# Patient Record
Sex: FEMALE | Race: WHITE | NOT HISPANIC OR LATINO | Employment: OTHER | URBAN - METROPOLITAN AREA
[De-identification: names, ages, dates, MRNs, and addresses within clinical notes are randomized per-mention and may not be internally consistent; named-entity substitution may affect disease eponyms.]

---

## 2017-07-25 ENCOUNTER — TELEPHONE (OUTPATIENT)
Dept: NURSING | Facility: CLINIC | Age: 72
End: 2017-07-25

## 2017-07-25 NOTE — TELEPHONE ENCOUNTER
"Pt asking if she needs to be seen for an annual it has been two yrs and she doesn't have a uterus. Informed scheduling \"Yes\" they will still want to do a breast exam, pelvic and vaginal PAP smear. Scheduling verbalized understanding.  "

## 2017-07-25 NOTE — TELEPHONE ENCOUNTER
Pt calling to schedule mammogram. Pt upset with choosing either a 2D or 3D mammogram. Pt also upset that she received letter with the wrong number to schedule her appointment. Pt informed to contact her insurance to find out what they cover. Pt thinks she had 2D in past and would like to schedule this. Transferred back to scheduling to make appointment. Scheduling letter given to clinic supervisor.

## 2017-08-02 ENCOUNTER — RADIANT APPOINTMENT (OUTPATIENT)
Dept: MAMMOGRAPHY | Facility: CLINIC | Age: 72
End: 2017-08-02
Payer: COMMERCIAL

## 2017-08-02 ENCOUNTER — OFFICE VISIT (OUTPATIENT)
Dept: OBGYN | Facility: CLINIC | Age: 72
End: 2017-08-02
Payer: COMMERCIAL

## 2017-08-02 VITALS — SYSTOLIC BLOOD PRESSURE: 120 MMHG | BODY MASS INDEX: 27.13 KG/M2 | WEIGHT: 170 LBS | DIASTOLIC BLOOD PRESSURE: 80 MMHG

## 2017-08-02 DIAGNOSIS — Z01.419 ENCOUNTER FOR GYNECOLOGICAL EXAMINATION WITHOUT ABNORMAL FINDING: Primary | ICD-10-CM

## 2017-08-02 DIAGNOSIS — Z12.31 VISIT FOR SCREENING MAMMOGRAM: ICD-10-CM

## 2017-08-02 PROCEDURE — G0202 SCR MAMMO BI INCL CAD: HCPCS | Mod: TC

## 2017-08-02 PROCEDURE — 99397 PER PM REEVAL EST PAT 65+ YR: CPT | Performed by: OBSTETRICS & GYNECOLOGY

## 2017-08-02 ASSESSMENT — PATIENT HEALTH QUESTIONNAIRE - PHQ9: 5. POOR APPETITE OR OVEREATING: NOT AT ALL

## 2017-08-02 ASSESSMENT — ANXIETY QUESTIONNAIRES
7. FEELING AFRAID AS IF SOMETHING AWFUL MIGHT HAPPEN: NOT AT ALL
5. BEING SO RESTLESS THAT IT IS HARD TO SIT STILL: NOT AT ALL
2. NOT BEING ABLE TO STOP OR CONTROL WORRYING: NOT AT ALL
3. WORRYING TOO MUCH ABOUT DIFFERENT THINGS: NOT AT ALL
1. FEELING NERVOUS, ANXIOUS, OR ON EDGE: NOT AT ALL
GAD7 TOTAL SCORE: 0
6. BECOMING EASILY ANNOYED OR IRRITABLE: NOT AT ALL
IF YOU CHECKED OFF ANY PROBLEMS ON THIS QUESTIONNAIRE, HOW DIFFICULT HAVE THESE PROBLEMS MADE IT FOR YOU TO DO YOUR WORK, TAKE CARE OF THINGS AT HOME, OR GET ALONG WITH OTHER PEOPLE: NOT DIFFICULT AT ALL

## 2017-08-02 NOTE — MR AVS SNAPSHOT
"              After Visit Summary   2017    Yamileth Duke    MRN: [id number]           Patient Information     Date Of Birth          1945        Visit Information        Provider Department      2017 3:00 PM Stefano Hunt MD [facility]        Today's Diagnoses     Encounter for gynecological examination without abnormal finding    -  1       Follow-ups after your visit        Who to contact     If you have questions or need follow up information about today's clinic visit or your schedule please contact [facility] directly at [phone].  Normal or non-critical lab and imaging results will be communicated to you by Trellohart, letter or phone within 4 business days after the clinic has received the results. If you do not hear from us within 7 days, please contact the clinic through Trellohart or phone. If you have a critical or abnormal lab result, we will notify you by phone as soon as possible.  Submit refill requests through [employer] or call your pharmacy and they will forward the refill request to us. Please allow 3 business days for your refill to be completed.          Additional Information About Your Visit        MyChart Information     [employer] lets you send messages to your doctor, view your test results, renew your prescriptions, schedule appointments and more. To sign up, go to [email or web address] . Click on \"Log in\" on the left side of the screen, which will take you to the Welcome page. Then click on \"Sign up Now\" on the right side of the page.     You will be asked to enter the access code listed below, as well as some personal information. Please follow the directions to create your username and password.     Your access code is: [email or web address]  Expires: 10/31/2017  3:33 PM     Your access code will  in 90 days. If you need help or a new code, please call your [facility] clinic or [phone].        Care EveryWhere ID     This is your " Care EveryWhere ID. This could be used by other organizations to access your [facility] medical records  [id number]        Your Vitals Were     BMI (Body Mass Index)                   27.13 kg/m2            Blood Pressure from Last 3 Encounters:   08/02/17 120/80   07/17/15 118/90   05/01/15 128/76    Weight from Last 3 Encounters:   08/02/17 170 lb (77.1 kg)   07/17/15 168 lb (76.2 kg)   05/01/15 160 lb (72.6 kg)              Today, you had the following     No orders found for display         Today's Medication Changes          These changes are accurate as of: 8/2/17  3:33 PM.  If you have any questions, ask your nurse or doctor.               Stop taking these medicines if you haven't already. Please contact your care team if you have questions.     predniSONE 1 MG tablet   Commonly known as:  DELTASONE   Stopped by:  Stefano Hunt MD                    Primary Care Provider Office Phone # Fax #    Rosaura Disla [phone]       [address] [address]        Equal Access to Services     [facility] AH: Hadii chris murillo hadasho Soness, waaxda luqadaha, qaybta kaalmabrenda hawkins, rao canales . So [facility] [phone].    ATENCIÓN: Si habla español, tiene a zhang disposición servicios gratuitos de asistencia lingüística. [facility] [phone].    We comply with applicable federal civil rights laws and [place] laws. We do not discriminate on the basis of race, color, national origin, age, disability sex, sexual orientation or gender identity.            Thank you!     Thank you for choosing [facility]  for your care. Our goal is always to provide you with excellent care. Hearing back from our patients is one way we can continue to improve our services. Please take a few minutes to complete the written survey that you may receive in the mail after your visit with us. Thank you!             Your Updated  Medication List - Protect others around you: Learn how to safely use, store and throw away your medicines at www.disposemymeds.org.          This list is accurate as of: 8/2/17  3:33 PM.  Always use your most recent med list.                   Brand Name Dispense Instructions for use Diagnosis    LEXAPRO PO      Take 10 mg by mouth daily

## 2017-08-02 NOTE — PROGRESS NOTES
Yamileth is a 71 year old No obstetric history on file. female who presents for annual exam.     Besides routine health maintenance, she has no other health concerns today .    HPI:  The patient's PCP is  Rosaura Disla MD.      Patient seen for her yearly exam.  Since her last appointment patient had an episode of polymyalgia for which she took prednisone for 1 year.  She is also  from her .  She is generally feeling well at this point.  She has no complaints today.      GYNECOLOGIC HISTORY:    No LMP recorded. Patient is postmenopausal.  Her current contraception method is: menopause.  She  reports that she has never smoked. She has never used smokeless tobacco.      Patient is not sexually active.  STD testing offered?  Declined  Last PHQ-9 score on record = PHQ-9 SCORE 8/2/2017   Total Score 0     Last GAD7 score on record =   LASHON-7 SCORE 8/2/2017   Total Score 0     Alcohol Score = 3    HEALTH MAINTENANCE:  Cholesterol: (No results found for: CHOL   Last Mammo: 7/19/16, Result: normal, Next Mammo: today 2D  Pap: 7/15/14, Neg, Hpv Neg, no longer needed Hysterectomy  Colonoscopy:  5/1/15, Result: polyps, Next Colonoscopy: 2018  Dexa:  7/17/15 Spine -1.7, Lfn -1.5, Rfn -0.9    Health maintenance updated:  yes    HISTORY:  Obstetric History     No data available          Patient Active Problem List   Diagnosis     Vaginal prolapse     Past Surgical History:   Procedure Laterality Date     COLONOSCOPY       COLONOSCOPY N/A 5/1/2015    Procedure: COMBINED COLONOSCOPY, SINGLE OR MULTIPLE BIOPSY/POLYPECTOMY BY BIOPSY;  Surgeon: Joe Mosley MD;  Location:  GI     COLPORRHAPHY ANTERIOR, POSTERIOR, COMBINED  1/31/2014    Procedure: COMBINED COLPORRHAPHY ANTERIOR, POSTERIOR;  ANTERIOR AND POSTERIOR REPAIR, ENTEROCOELE, PERINEOPLASTY;  Surgeon: Stefano Hunt MD;  Location:  OR     CYSTOCELE REPAIR  2014     EYE SURGERY      retinal detachment repair     EYE SURGERY      cataract removal      GYN SURGERY  1998    D&C, right ovary removed     GYN SURGERY  2000    hysterectomy, bladder repair     RECTOCELE REPAIR  2014      Social History   Substance Use Topics     Smoking status: Never Smoker     Smokeless tobacco: Never Used     Alcohol use Yes      Comment: occasionally           Current Outpatient Prescriptions   Medication Sig     Escitalopram Oxalate (LEXAPRO PO) Take 10 mg by mouth daily     No current facility-administered medications for this visit.      Facility-Administered Medications Ordered in Other Visits   Medication     bupivacaine 0.75% in dextrose 8.25% (intrathecal) (SENSORCAINE) 0.75-8.25 % injection     Allergies   Allergen Reactions     Penicillins Rash       Past medical, surgical, social and family histories were reviewed and updated in EPIC.    ROS:   12 point review of systems negative other than symptoms noted below.    EXAM:  /80  Wt 170 lb (77.1 kg)  BMI 27.13 kg/m2   BMI: Body mass index is 27.13 kg/(m^2).    PHYSICAL EXAM:  Constitutional:  Appearance: Well nourished, well developed, alert, in no acute distress  Neck:  Lymph Nodes:  No lymphadenopathy present    Thyroid:  Gland size normal, nontender, no nodules or masses present  on palpation  Chest:  Respiratory Effort:  Breathing unlabored  Cardiovascular:    Heart: Auscultation:  Regular rate, normal rhythm, no murmurs present  Breasts: Inspection of Breasts:  No lymphadenopathy present    Palpation of Breasts and Axillae:  No masses present on palpation, no  breast tenderness    Axillary Lymph Nodes:  No lymphadenopathy present  Gastrointestinal:   Abdominal Examination:  Abdomen nontender to palpation, tone normal without rigidity or guarding, no masses present, umbilicus without lesions   Liver and Spleen:  No hepatomegaly present, liver nontender to palpation    Hernias:  No hernias present  Lymphatic: Lymph Nodes:  No other lymphadenopathy present  Skin:  General Inspection:  No rashes present, no  lesions present, no areas of  discoloration    Genitalia and Groin:  No rashes present, no lesions present, no areas of  discoloration, no masses present  Neurologic/Psychiatric:    Mental Status:  Oriented X3     Pelvic Exam:  External Genitalia:     Normal appearance for age, no discharge present, no tenderness present, no inflammatory lesions present, color normal  Vagina:     Normal vaginal vault without central or paravaginal defects, no discharge present, no inflammatory lesions present, no masses present  Bladder:     Nontender to palpation  Urethra:   Urethral Body:  Urethra palpation normal, urethra structural support normal   Urethral Meatus:  No erythema or lesions present  Cervix:     Surgically absent  Uterus:     Surgically absent  Adnexa:     Surgically absent  Perineum:     Perineum within normal limits, no evidence of trauma, no rashes or skin lesions present  Anus:     Anus within normal limits, no hemorrhoids present  Inguinal Lymph Nodes:     No lymphadenopathy present      COUNSELING:   Reviewed preventive health counseling, as reflected in patient instructions       Regular exercise       Healthy diet/nutrition    BMI: Body mass index is 27.13 kg/(m^2).        ASSESSMENT:  71 year old female with satisfactory annual exam.No diagnosis found.    PLAN:  Return to clinic as needed or in 1 year.    Stefano Hunt MD

## 2017-08-03 ASSESSMENT — PATIENT HEALTH QUESTIONNAIRE - PHQ9: SUM OF ALL RESPONSES TO PHQ QUESTIONS 1-9: 0

## 2017-08-03 ASSESSMENT — ANXIETY QUESTIONNAIRES: GAD7 TOTAL SCORE: 0

## 2018-08-22 ENCOUNTER — RADIANT APPOINTMENT (OUTPATIENT)
Dept: MAMMOGRAPHY | Facility: CLINIC | Age: 73
End: 2018-08-22
Attending: OBSTETRICS & GYNECOLOGY
Payer: COMMERCIAL

## 2018-08-22 DIAGNOSIS — Z12.31 VISIT FOR SCREENING MAMMOGRAM: ICD-10-CM

## 2018-08-22 PROCEDURE — 77067 SCR MAMMO BI INCL CAD: CPT | Mod: TC

## 2018-08-22 PROCEDURE — 77063 BREAST TOMOSYNTHESIS BI: CPT | Mod: TC

## 2019-01-11 LAB
CREAT SERPL-MCNC: 0.69 MG/DL (ref 0.51–0.95)
GFR SERPL CREATININE-BSD FRML MDRD: >60 ML/MIN/1.73ME2 (ref 60–150)
GLUCOSE SERPL-MCNC: 108 MG/DL (ref 74–100)
POTASSIUM SERPL-SCNC: 4.1 MMOL/L (ref 3.5–5.1)

## 2019-01-15 ENCOUNTER — TRANSFERRED RECORDS (OUTPATIENT)
Dept: HEALTH INFORMATION MANAGEMENT | Facility: CLINIC | Age: 74
End: 2019-01-15

## 2019-01-15 ENCOUNTER — OFFICE VISIT (OUTPATIENT)
Dept: OBGYN | Facility: CLINIC | Age: 74
End: 2019-01-15
Payer: COMMERCIAL

## 2019-01-15 VITALS
DIASTOLIC BLOOD PRESSURE: 74 MMHG | HEIGHT: 67 IN | BODY MASS INDEX: 26.68 KG/M2 | WEIGHT: 170 LBS | HEART RATE: 74 BPM | SYSTOLIC BLOOD PRESSURE: 128 MMHG

## 2019-01-15 DIAGNOSIS — N30.00 ACUTE CYSTITIS WITHOUT HEMATURIA: Primary | ICD-10-CM

## 2019-01-15 PROCEDURE — 99213 OFFICE O/P EST LOW 20 MIN: CPT | Performed by: OBSTETRICS & GYNECOLOGY

## 2019-01-15 RX ORDER — TIMOLOL MALEATE 5 MG/ML
1 SOLUTION/ DROPS OPHTHALMIC DAILY
COMMUNITY
Start: 2018-04-24

## 2019-01-15 RX ORDER — CIPROFLOXACIN 500 MG/1
500 TABLET, FILM COATED ORAL 2 TIMES DAILY
COMMUNITY
Start: 2019-01-15 | End: 2019-01-22

## 2019-01-15 RX ORDER — MIRTAZAPINE 15 MG/1
7.5 TABLET, FILM COATED ORAL
COMMUNITY
Start: 2018-11-08 | End: 2024-09-10

## 2019-01-15 RX ORDER — LORAZEPAM 0.5 MG/1
1 TABLET ORAL AT BEDTIME
COMMUNITY
Start: 2018-03-13

## 2019-01-15 RX ORDER — PHENAZOPYRIDINE HYDROCHLORIDE 100 MG/1
TABLET, FILM COATED ORAL
Refills: 0 | COMMUNITY
Start: 2019-01-11 | End: 2024-09-10

## 2019-01-15 ASSESSMENT — MIFFLIN-ST. JEOR: SCORE: 1303.74

## 2019-01-15 NOTE — PROGRESS NOTES
SUBJECTIVE:                                                   Yamileth Duke is a 73 year old female who presents to clinic today for the following health issue(s):  Patient presents with:  ER F/U: was seen at [facility] for bladder issues- dysuria, frequency and pressure twice within a week. Last Friday all came back negative, this morning came back w/ bladder infection      HPI:  Patient is seen today for urinary tract infection.  Patient went to urgent care earlier yesterday and was diagnosed with a significant UTI.  She was started on Cipro 500 mg twice daily.  She is seen today and she feels that her infection is improved.  This is the only urinary tract infection she has had in several years.  She has a past history of having had a vaginal hysterectomy as well as vaginal repairs for prolapse.  Prior to the infection she was not experiencing any difficulty.    No LMP recorded. Patient is postmenopausal..   Patient is not sexually active, No obstetric history on file..  Using not sexually active for contraception.    reports that  has never smoked. she has never used smokeless tobacco.    STD testing offered?  Declined    Health maintenance updated:  yes    Problem list and histories reviewed & adjusted, as indicated.  Additional history: as documented.    Patient Active Problem List   Diagnosis     Vaginal prolapse     Past Surgical History:   Procedure Laterality Date     COLONOSCOPY       COLONOSCOPY N/A 5/1/2015    Procedure: COMBINED COLONOSCOPY, SINGLE OR MULTIPLE BIOPSY/POLYPECTOMY BY BIOPSY;  Surgeon: Joe Mosley MD;  Location:  GI     COLPORRHAPHY ANTERIOR, POSTERIOR, COMBINED  1/31/2014    Procedure: COMBINED COLPORRHAPHY ANTERIOR, POSTERIOR;  ANTERIOR AND POSTERIOR REPAIR, ENTEROCOELE, PERINEOPLASTY;  Surgeon: Stefano Hunt MD;  Location:  OR     CYSTOCELE REPAIR  2014     EYE SURGERY      retinal detachment repair     EYE SURGERY      cataract removal     GYN  "SURGERY  1998    D&C, right ovary removed     GYN SURGERY  2000    hysterectomy, bladder repair     RECTOCELE REPAIR  2014      Social History     Tobacco Use     Smoking status: Never Smoker     Smokeless tobacco: Never Used   Substance Use Topics     Alcohol use: Yes     Comment: occasionally           Current Outpatient Medications   Medication Sig     ciprofloxacin (CIPRO) 500 MG tablet Take 500 mg by mouth 2 times daily     LORazepam (ATIVAN) 0.5 MG tablet TK 1 T PO BID PRA     mirtazapine (REMERON) 15 MG tablet Take 7.5 mg by mouth     phenazopyridine (PYRIDIUM) 100 MG tablet TK 1 T PO TID AFTER MEALS AS NEEDED     timolol maleate (TIMOPTIC) 0.5 % ophthalmic solution      No current facility-administered medications for this visit.      Facility-Administered Medications Ordered in Other Visits   Medication     bupivacaine 0.75% in dextrose 8.25% (intrathecal) (SENSORCAINE) 0.75-8.25 % injection     Allergies   Allergen Reactions     No Clinical Screening - See Comments      PN: LW Other1: -nka  PN: LW CM1: CONTRAST- nka Reaction :     Penicillins Rash       ROS:  12 point review of systems negative other than symptoms noted below.  Genitourinary: Frequency, Painful Urination and Urgency    OBJECTIVE:     /74   Pulse 74   Ht 1.702 m (5' 7\")   Wt 77.1 kg (170 lb)   BMI 26.63 kg/m    Body mass index is 26.63 kg/m .    Exam:  Constitutional:  Appearance: Well nourished, well developed alert, in no acute distress  Chest:  Respiratory Effort:  Breathing unlabored  Neurologic/Psychiatric:  Mental Status:  Oriented X3      In-Clinic Test Results:  No results found for this or any previous visit (from the past 24 hour(s)).    ASSESSMENT/PLAN:                                                        ICD-10-CM    1. Acute cystitis without hematuria N30.00            Plan: The patient was encouraged to take the 7 days of her medication and then return to her primary care clinic for follow-up urinalysis to make " sure that her infection has cleared.  She will return to clinic as needed.    This appointment was 20 minutes of which more than 50% was face-to-face in consultation coronation of care.    Stefano Hunt MD  [facility]

## 2021-09-14 ENCOUNTER — LAB REQUISITION (OUTPATIENT)
Dept: LAB | Facility: CLINIC | Age: 76
End: 2021-09-14
Payer: COMMERCIAL

## 2021-09-14 DIAGNOSIS — Z03.818 ENCOUNTER FOR OBSERVATION FOR SUSPECTED EXPOSURE TO OTHER BIOLOGICAL AGENTS RULED OUT: ICD-10-CM

## 2021-09-14 PROCEDURE — U0003 INFECTIOUS AGENT DETECTION BY NUCLEIC ACID (DNA OR RNA); SEVERE ACUTE RESPIRATORY SYNDROME CORONAVIRUS 2 (SARS-COV-2) (CORONAVIRUS DISEASE [COVID-19]), AMPLIFIED PROBE TECHNIQUE, MAKING USE OF HIGH THROUGHPUT TECHNOLOGIES AS DESCRIBED BY CMS-2020-01-R: HCPCS | Mod: ORL | Performed by: FAMILY MEDICINE

## 2021-09-15 LAB — SARS-COV-2 RNA RESP QL NAA+PROBE: NEGATIVE

## 2021-09-20 ENCOUNTER — LAB REQUISITION (OUTPATIENT)
Dept: LAB | Facility: CLINIC | Age: 76
End: 2021-09-20
Payer: COMMERCIAL

## 2021-09-20 DIAGNOSIS — Z03.818 ENCOUNTER FOR OBSERVATION FOR SUSPECTED EXPOSURE TO OTHER BIOLOGICAL AGENTS RULED OUT: ICD-10-CM

## 2021-09-21 PROCEDURE — U0003 INFECTIOUS AGENT DETECTION BY NUCLEIC ACID (DNA OR RNA); SEVERE ACUTE RESPIRATORY SYNDROME CORONAVIRUS 2 (SARS-COV-2) (CORONAVIRUS DISEASE [COVID-19]), AMPLIFIED PROBE TECHNIQUE, MAKING USE OF HIGH THROUGHPUT TECHNOLOGIES AS DESCRIBED BY CMS-2020-01-R: HCPCS | Mod: ORL | Performed by: FAMILY MEDICINE

## 2021-09-24 LAB — SARS-COV-2 RNA RESP QL NAA+PROBE: NOT DETECTED

## 2022-01-03 ENCOUNTER — LAB REQUISITION (OUTPATIENT)
Dept: LAB | Facility: CLINIC | Age: 77
End: 2022-01-03
Payer: COMMERCIAL

## 2022-01-03 DIAGNOSIS — Z03.818 ENCOUNTER FOR OBSERVATION FOR SUSPECTED EXPOSURE TO OTHER BIOLOGICAL AGENTS RULED OUT: ICD-10-CM

## 2022-01-03 PROCEDURE — U0003 INFECTIOUS AGENT DETECTION BY NUCLEIC ACID (DNA OR RNA); SEVERE ACUTE RESPIRATORY SYNDROME CORONAVIRUS 2 (SARS-COV-2) (CORONAVIRUS DISEASE [COVID-19]), AMPLIFIED PROBE TECHNIQUE, MAKING USE OF HIGH THROUGHPUT TECHNOLOGIES AS DESCRIBED BY CMS-2020-01-R: HCPCS | Mod: ORL | Performed by: FAMILY MEDICINE

## 2022-01-04 LAB — SARS-COV-2 RNA RESP QL NAA+PROBE: NEGATIVE

## 2023-10-25 PROCEDURE — 88305 TISSUE EXAM BY PATHOLOGIST: CPT | Performed by: PATHOLOGY

## 2023-10-26 ENCOUNTER — LAB REQUISITION (OUTPATIENT)
Dept: LAB | Facility: CLINIC | Age: 78
End: 2023-10-26

## 2023-10-26 DIAGNOSIS — K30 FUNCTIONAL DYSPEPSIA: ICD-10-CM

## 2023-10-26 DIAGNOSIS — R12 HEARTBURN: ICD-10-CM

## 2023-10-26 DIAGNOSIS — K31.89 OTHER DISEASES OF STOMACH AND DUODENUM: ICD-10-CM

## 2023-10-26 DIAGNOSIS — K31.7 POLYP OF STOMACH AND DUODENUM: ICD-10-CM

## 2023-10-27 LAB
PATH REPORT.COMMENTS IMP SPEC: NORMAL
PATH REPORT.COMMENTS IMP SPEC: NORMAL
PATH REPORT.FINAL DX SPEC: NORMAL
PATH REPORT.GROSS SPEC: NORMAL
PATH REPORT.MICROSCOPIC SPEC OTHER STN: NORMAL
PATH REPORT.RELEVANT HX SPEC: NORMAL
PHOTO IMAGE: NORMAL

## 2024-09-10 ENCOUNTER — HOSPITAL ENCOUNTER (OUTPATIENT)
Facility: CLINIC | Age: 79
Setting detail: OBSERVATION
Discharge: HOME | End: 2024-09-12
Attending: EMERGENCY MEDICINE | Admitting: EMERGENCY MEDICINE
Payer: COMMERCIAL

## 2024-09-10 DIAGNOSIS — F51.01 PRIMARY INSOMNIA: ICD-10-CM

## 2024-09-10 DIAGNOSIS — F33.1 MAJOR DEPRESSIVE DISORDER, RECURRENT EPISODE, MODERATE (H): ICD-10-CM

## 2024-09-10 DIAGNOSIS — F41.1 GAD (GENERALIZED ANXIETY DISORDER): ICD-10-CM

## 2024-09-10 PROBLEM — F41.9 ANXIETY: Chronic | Status: ACTIVE | Noted: 2024-09-10

## 2024-09-10 PROCEDURE — G0378 HOSPITAL OBSERVATION PER HR: HCPCS

## 2024-09-10 PROCEDURE — 250N000013 HC RX MED GY IP 250 OP 250 PS 637: Performed by: PSYCHIATRY & NEUROLOGY

## 2024-09-10 PROCEDURE — 99285 EMERGENCY DEPT VISIT HI MDM: CPT

## 2024-09-10 PROCEDURE — 99222 1ST HOSP IP/OBS MODERATE 55: CPT | Mod: AI | Performed by: PSYCHIATRY & NEUROLOGY

## 2024-09-10 PROCEDURE — 250N000013 HC RX MED GY IP 250 OP 250 PS 637: Performed by: EMERGENCY MEDICINE

## 2024-09-10 RX ORDER — LORAZEPAM 1 MG/1
1 TABLET ORAL AT BEDTIME
Status: DISCONTINUED | OUTPATIENT
Start: 2024-09-10 | End: 2024-09-12 | Stop reason: HOSPADM

## 2024-09-10 RX ORDER — METOPROLOL SUCCINATE 25 MG/1
0.5 TABLET, EXTENDED RELEASE ORAL DAILY
COMMUNITY
Start: 2024-05-02

## 2024-09-10 RX ORDER — LANOLIN ALCOHOL/MO/W.PET/CERES
3 CREAM (GRAM) TOPICAL
Status: DISCONTINUED | OUTPATIENT
Start: 2024-09-10 | End: 2024-09-12 | Stop reason: HOSPADM

## 2024-09-10 RX ORDER — PANTOPRAZOLE SODIUM 20 MG/1
20 TABLET, DELAYED RELEASE ORAL DAILY
COMMUNITY
Start: 2024-09-07

## 2024-09-10 RX ORDER — HYDROXYZINE PAMOATE 25 MG/1
25 CAPSULE ORAL 2 TIMES DAILY PRN
Status: DISCONTINUED | OUTPATIENT
Start: 2024-09-10 | End: 2024-09-11

## 2024-09-10 RX ORDER — LORAZEPAM 1 MG/1
1 TABLET ORAL ONCE
Status: COMPLETED | OUTPATIENT
Start: 2024-09-10 | End: 2024-09-10

## 2024-09-10 RX ORDER — HYDROXYZINE PAMOATE 25 MG/1
25 CAPSULE ORAL 2 TIMES DAILY PRN
COMMUNITY

## 2024-09-10 RX ORDER — CITALOPRAM HYDROBROMIDE 20 MG/1
20 TABLET ORAL DAILY
Status: DISCONTINUED | OUTPATIENT
Start: 2024-09-11 | End: 2024-09-12 | Stop reason: HOSPADM

## 2024-09-10 RX ORDER — TIMOLOL MALEATE 5 MG/ML
1 SOLUTION/ DROPS OPHTHALMIC DAILY
Status: DISCONTINUED | OUTPATIENT
Start: 2024-09-11 | End: 2024-09-12 | Stop reason: HOSPADM

## 2024-09-10 RX ORDER — MIRTAZAPINE 7.5 MG/1
7.5 TABLET, FILM COATED ORAL AT BEDTIME
Status: DISCONTINUED | OUTPATIENT
Start: 2024-09-10 | End: 2024-09-12 | Stop reason: HOSPADM

## 2024-09-10 RX ORDER — ACETAMINOPHEN 500 MG
500 TABLET ORAL EVERY 6 HOURS PRN
Status: DISCONTINUED | OUTPATIENT
Start: 2024-09-10 | End: 2024-09-12 | Stop reason: HOSPADM

## 2024-09-10 RX ORDER — CITALOPRAM HYDROBROMIDE 20 MG/1
20 TABLET ORAL DAILY
COMMUNITY

## 2024-09-10 RX ADMIN — APIXABAN 2.5 MG: 2.5 TABLET, FILM COATED ORAL at 20:21

## 2024-09-10 RX ADMIN — LORAZEPAM 1 MG: 1 TABLET ORAL at 13:14

## 2024-09-10 RX ADMIN — LORAZEPAM 1 MG: 1 TABLET ORAL at 21:34

## 2024-09-10 RX ADMIN — MIRTAZAPINE 7.5 MG: 7.5 TABLET, FILM COATED ORAL at 21:34

## 2024-09-10 ASSESSMENT — ACTIVITIES OF DAILY LIVING (ADL)
ADLS_ACUITY_SCORE: 35

## 2024-09-10 ASSESSMENT — COLUMBIA-SUICIDE SEVERITY RATING SCALE - C-SSRS
6. HAVE YOU EVER DONE ANYTHING, STARTED TO DO ANYTHING, OR PREPARED TO DO ANYTHING TO END YOUR LIFE?: NO
2. HAVE YOU ACTUALLY HAD ANY THOUGHTS OF KILLING YOURSELF IN THE PAST MONTH?: NO
1. IN THE PAST MONTH, HAVE YOU WISHED YOU WERE DEAD OR WISHED YOU COULD GO TO SLEEP AND NOT WAKE UP?: NO

## 2024-09-10 NOTE — ED PROVIDER NOTES
EmPATH Unit - Initial Psychiatric Observation Note  [facility] Emergency Department  Observation Initiation Date: Sep 10, 2024    Yamileth Duke MRN: [id number]   Age: 79 year old YOB: 1945     History     Chief Complaint   Patient presents with    Panic Attack     HPI  Yamileth Duke is a 79 year old female with a past history notable for major depressive disorder and a generalized anxiety disorder who presents to the emergency department reporting concern for heightened anxiety.  She was determined to be medically stable and transferred to the EmPATH unit for psychiatric assessment.  She is now approaching 3 hours in the emergency department.  On examination, the patient reviews with me a long history of mood and anxiety symptoms for which she has been receiving pharmacological treatments since the early 90s.  She characterized gaining a good response to SSRIs, often leading to symptom remission, and medication discontinuation.  More recently, she has been on citalopram for a few years with good response.  For reasons that are not clear to her, symptoms of her anxiety disorder have gradually reemerged and led to moments of panic and difficulty sleeping at night.  Her dose of lorazepam was subsequently increased and symptom intensity led to hospitalization at an outside facility earlier this month.  She was discharged about 5 days ago with plans to transition to intensive outpatient programming.  While in route to the program, anxiety began to intensify leading to her arrival in the emergency department.  After receiving a dose of Ativan earlier in her stay, symptom intensity has subsided.  She reviews with me concern for ongoing insomnia and was interested in discussing additional treatment options aimed at lessening symptoms of her anxiety disorder.  She denied suicidal and homicidal thoughts.  There was no indication of psychosis.  Other symptoms of concern involve loss of weight, poor appetite,  occasional nausea, which she attributes to a recent H. pylori infection.  As we reviewed past medication trials, she was able to identify various SSRIs, nortriptyline which she responded to favorably however complicated by weight gain, mirtazapine as monotherapy which led to symptom remission and eventual medication discontinuation.    Past Medical History  Past Medical History:   Diagnosis Date    Depression      Past Surgical History:   Procedure Laterality Date    COLONOSCOPY      COLONOSCOPY N/A 5/1/2015    Procedure: COMBINED COLONOSCOPY, SINGLE OR MULTIPLE BIOPSY/POLYPECTOMY BY BIOPSY;  Surgeon: Joe Mosley MD;  Location:  GI    COLPORRHAPHY ANTERIOR, POSTERIOR, COMBINED  1/31/2014    Procedure: COMBINED COLPORRHAPHY ANTERIOR, POSTERIOR;  ANTERIOR AND POSTERIOR REPAIR, ENTEROCOELE, PERINEOPLASTY;  Surgeon: Stefano Hunt MD;  Location:  OR    CYSTOCELE REPAIR  2014    EYE SURGERY      retinal detachment repair    EYE SURGERY      cataract removal    GYN SURGERY  1998    D&C, right ovary removed    GYN SURGERY  2000    hysterectomy, bladder repair    RECTOCELE REPAIR  2014     apixaban ANTICOAGULANT (ELIQUIS) 2.5 MG tablet  citalopram (CELEXA) 20 MG tablet  hydrOXYzine griselda (VISTARIL) 25 MG capsule  LORazepam (ATIVAN) 0.5 MG tablet  metoprolol succinate ER (TOPROL XL) 25 MG 24 hr tablet  pantoprazole (PROTONIX) 20 MG EC tablet  timolol maleate (TIMOPTIC) 0.5 % ophthalmic solution      Allergies   Allergen Reactions    No Clinical Screening - See Comments      PN: LW Other1: -nka  PN: LW CM1: CONTRAST- nka Reaction :    Penicillins Rash and Other (See Comments)     Family History  No family history on file.  Social History   Social History     Tobacco Use    Smoking status: Never    Smokeless tobacco: Never   Substance Use Topics    Alcohol use: Yes     Comment: occasionally    Drug use: No          Review of Systems  A medically appropriate review of systems was performed with pertinent  "positives and negatives noted in the HPI, and all other systems negative.    Physical Examination   BP: (!) 137/93  Pulse: 69  Temp: 96.8  F (36  C)  Resp: 18  Height: 170.2 cm (5' 7\")  Weight: 58.5 kg (129 lb)  SpO2: 95 %    Physical Exam  General: Appears stated age.   Neuro: Alert and fully oriented. Extremities appear to demonstrate normal strength on visual inspection.   Integumentary/Skin: no rash visualized, normal color    Psychiatric Examination   Appearance: awake, alert  Attitude:  cooperative  Eye Contact:  fair  Mood:  anxious  Affect:  intensity is blunted  Speech:  clear, coherent  Psychomotor Behavior:  no evidence of tardive dyskinesia, dystonia, or tics  Thought Process:  logical and linear  Associations:  no loose associations  Thought Content:  no evidence of suicidal ideation or homicidal ideation and no evidence of psychotic thought  Insight:  fair  Judgement:  fair  Oriented to:  time, person, and place  Attention Span and Concentration:  fair  Recent and Remote Memory:  fair  Language: able to name/identify objects without impairment  Fund of Knowledge: intact with awareness of current and past events    ED Course        Labs Ordered and Resulted from Time of ED Arrival to Time of ED Departure - No data to display    Assessments & Plan (with Medical Decision Making)   Patient presenting with worsening anxiety and panic despite engaging in routine outpatient care.  Her treatment plan focused on medication optimization while aiming to transition her care back to [facility]. Nursing notes reviewed noting no acute issues.     I have reviewed the assessment completed by the [facility].     During the observation period, the patient did not require medications for agitation, and did not require restraints/seclusion for patient and/or provider safety.     The patient was found to have a psychiatric condition that would benefit from an observation stay in the emergency department for further psychiatric " stabilization and/or coordination of a safe disposition. The observation plan includes serial assessments of psychiatric condition, potential administration of medications if indicated, further disposition pending the patient's psychiatric course during the monitoring period.     Preliminary diagnosis:    ICD-10-CM    1. Major depressive disorder, recurrent episode, moderate (H)  F33.1       2. LASHON (generalized anxiety disorder)  F41.1       3. Primary insomnia  F51.01            Treatment Plan:  -Begin mirtazapine 7.5 mg nightly for augmentation of her antidepressant with secondary benefits at addressing insomnia and helping to normalize her appetite.  Risks and benefits of the medication were reviewed.  Depending on her response and tolerability over the next 3 to 4 weeks, a higher dose may be considered to optimize effectiveness  -Continue citalopram 20 mg daily for antianxiety and antidepressant treatment  -Continue lorazepam 1 mg daily as needed for anxiety or insomnia.  As mirtazapine provides additional therapeutic benefit, it may be possible to lessen the dose of lorazepam to minimize longer term side effects.  -Anticipate resuming outpatient medication management appointments, psychotherapy, and IOP  -Enter to observation status and reassess tomorrow    --  Ferny Vazquez MD   [facility] EMERGENCY DEPT  EmPATH Unit       Ferny Vazquez MD  09/10/24 [id number]

## 2024-09-10 NOTE — CONSULTS
Diagnostic Evaluation Consultation  Crisis Assessment    Patient Name: Yamileth Duke  Age:  79 year old  Legal Sex: female  Gender Identity: female  Pronouns:   Race: White  Ethnicity: Not  or   Language: English      Patient was assessed: In person   Crisis Assessment Start Date: 09/10/24  Crisis Assessment Start Time: 1355  Crisis Assessment Stop Time: 1425  Patient location: [facility] EMERGENCY DEPT                             [id number]    Referral Data and Chief Complaint  Yamileth Duke presents to the ED by  self (Pt [employer] she has set up through her [facility] community.)  Patient is presenting to the ED for the following concerns: Anxiety.   Factors that make the mental health crisis life threatening or complex are:  Yamileth Duke is a 79 year old female who presents to the Emergency Department due to anxiety. Per chart review and patient reports, they have a history of anxiety.   Patient presents calm and cooperative. After introduction, patient is agreeable to DEC assessment with this writer.      Patient was brought in by private car. Patient lives in [facility]. She lives alone and is able to manage all ADLS. She has adult children who live in the area.   Patient was diagnosed with H-pylori at the end of July, since then she has had uncontrollable anxiety.    Today patient was scheduled to have her final intake appointment with [facility] for their anxiety group and PHP. Patient has arranged for transportation for the appointment.     Per the patient she received a call from a [facility], , she shared with the  her uncontrollable anxiety and how she has not been able to get the help she needs. The [facility]  had patient call [facility], who then instructed the patient to go to the Emergency Department for further assessment.   Patient had her transportation person bring her  "to the Emergency Department instead of her [facility] appointment.   Patient felt like her anxiety was too high and that she was not pat to go to the appointment with out a medication for anxiety. Patient reports she is prescribed Ativan for anxiety at bedtime, but she would like Ativan to take through out the day. She feels like this is the only medication that has helped with her anxiety. Patient denies suicidal homicidal ideation and self injurious behavior, she makes the statement, \"I can't do this another day.\" She reports that she feels helpless but not suicidal, \"oh not to that extent.\" Patient reports racing thoughts, fear, unspecified worry, and feeling helpless..      Informed Consent and Assessment Methods  Explained the crisis assessment process, including applicable information disclosures and limits to confidentiality, assessed understanding of the process, and obtained consent to proceed with the assessment.  Assessment methods included conducting a formal interview with patient, review of medical records, collaboration with medical staff, and obtaining relevant collateral information from family and community providers when available.  : done     Patient response to interventions: eager to participate, acceptance expressed, verbalizes understanding  Coping skills were attempted to reduce the crisis:  Prior to arrival Yamileth attempted to calm her anxiety with calls to friends and family, attempts a restorative rest. Attempted to engage with Yamileth F Duke offering therapeutic listening and support for the current crisis.     History of the Crisis   Today is the patient's seventh presentation to an Emergency Department for similar presentation of uncontrollable anxiety. Patient was recently admitted to [facility] Geriatric psychiatry unit for nine days. She was discharged with a comprehensive treatment plan for outpatient mental health treatment and continued medication management.  It was " "recommended that if patient needed more cares in the community, they should consider assisted living versus, senior independent living where the patient current resides.   She found the stay at [facility] to be minimally helpful and the accommodation were not what the patient would prefer. \"The beds were terrible. The blankets aren't right. I had a roommate who was a snore-er and she would play her radio all day.\" Patient felt like she was not able to get adequate sleep despite medications.   Patient was discharged on Friday, and reports \"starting Saturday morning I have had constant, all day long anxiety.\"    Patient has a therapist , Leslie Ga, she has a follow-up appointment on 9/13/24 at 1pm. She has transportation available through her senior [facility] community. The patient is scheduled to have visiting angels come in to the home to offer additional supports.    Patient expressed understanding that PHP mental health treatment would be most helpful, but feels her anxiety is too acute to get to these appointments.    Brief Psychosocial History  Family:  Single, Children yes  Support System:  Children, Friend, Neighbor  Employment Status:  retired  Source of Income:  pension/[facility], social security  Financial Environmental Concerns:  none  Current Hobbies:  exercise/fitness, family functions, outdoor activities  Barriers in Personal Life:  mental health concerns    Significant Clinical History  Current Anxiety Symptoms:  anxious, excessive worry  Current Depression/Trauma:  hopelessness  Current Somatic Symptoms:  anxious  Current Psychosis/Thought Disturbance:     Current Eating Symptoms:  loss of appetite (Pt reports loss of appetite due to hx of H-pylori)  Chemical Use History:  Alcohol: None  Benzodiazepines: None  Opiates: None  Cocaine: None  Marijuana: None  Other Use: None   Past diagnosis:  Depression  Family history:  No known history of mental health or chemical health concerns  Past treatment:  " Individual therapy, Psychiatric Medication Management, Inpatient Hospitalization  Details of most recent treatment:  Pt was admitted to [facility] from 8/28/24-9/6/24 pt was successfully discharged to op therapy, groups and [facility] tx.  Other relevant history:          Collateral Information  Is there collateral information: Yes (kathleen req call back- 9/10/24 236 p)     Collateral information name, relationship, phone number:  Benjy, Adult son, [phone]    What happened today: Son was not aware of pt being in the hospital.     What is different about patient's functioning: She's been like this for two months now. She is all wound up about the side effects and possible side effects of the H-pilori     Concern about alcohol/drug use:  none     What do you think the patient needs:  to follow up with her PHP treatment recommendations.     Has patient made comments about wanting to kill themselves/others: no    If d/c is recommended, can they take part in safety/aftercare planning:  yes    Additional collateral information:        Risk Assessment  [place] Suicide Severity Rating Scale Full Clinical Version:  Suicidal Ideation  Q1 Wish to be Dead (Lifetime): No  Q2 Non-Specific Active Suicidal Thoughts (Lifetime): No  Q6 Suicide Behavior (Lifetime): no     Suicidal Behavior (Lifetime)  Actual Attempt (Lifetime): No  Has subject engaged in non-suicidal self-injurious behavior? (Lifetime): No  Interrupted Attempts (Lifetime): No  Aborted or Self-Interrupted Attempt (Lifetime): No  Preparatory Acts or Behavior (Lifetime): No    [place] Suicide Severity Rating Scale Recent:   Suicidal Ideation (Recent)  Q1 Wished to be Dead (Past Month): no  Q2 Suicidal Thoughts (Past Month): no  Level of Risk per Screen: no risks indicated     Suicidal Behavior (Recent)  Actual Attempt (Past 3 Months): No  Interrupted Attempts (Past 3 Months): No  Aborted or Self-Interrupted Attempt (Past 3 Months): No  Preparatory Acts or Behavior  (Past 3 Months): No    Environmental or Psychosocial Events:    Protective Factors: Protective Factors: strong bond to family unit, community support, or employment, lives in a responsibly safe and stable environment, help seeking    Does the patient have thoughts of harming others? Feels Like Hurting Others: no  Previous Attempt to Hurt Others: no  Is the patient engaging in sexually inappropriate behavior?: no    Is the patient engaging in sexually inappropriate behavior?  no        Mental Status Exam   Affect:    Appearance: Appropriate  Attention Span/Concentration: Attentive  Eye Contact: Engaged    Fund of Knowledge: Appropriate   Language /Speech Content: Fluent  Language /Speech Volume: Normal, Soft  Language /Speech Rate/Productions: Normal  Recent Memory: Intact  Remote Memory: Intact  Mood: Normal  Orientation to Person: Yes   Orientation to Place: Yes  Orientation to Time of Day: Yes  Orientation to Date: Yes     Situation (Do they understand why they are here?): Yes  Psychomotor Behavior: Normal  Thought Content: Clear  Thought Form: Intact     Medication  Psychotropic medications:   Medication Orders - Psychiatric (From admission, onward)      Start     Dose/Rate Route Frequency Ordered Stop    09/11/24 0800  citalopram (celeXA) tablet 20 mg         20 mg Oral DAILY 09/10/24 1518      09/10/24 2200  LORazepam (ATIVAN) tablet 1 mg         1 mg Oral AT BEDTIME 09/10/24 1518      09/10/24 2200  mirtazapine (REMERON) tablet TABS 7.5 mg         7.5 mg Oral AT BEDTIME 09/10/24 1518      09/10/24 1517  hydrOXYzine griselda (VISTARIL) capsule 25 mg         25 mg Oral 2 TIMES DAILY PRN 09/10/24 1518               Current Care Team  Patient Care Team:  Rosaura Disla MD as PCP - General (Family Practice)    Diagnosis  Patient Active Problem List   Diagnosis Code    Vaginal prolapse N81.10    Anxiety F41.9    Major depressive disorder, recurrent episode, moderate (H) F33.1    Primary insomnia F51.01    LASHON  (generalized anxiety disorder) F41.1       Primary Problem This Admission  Active Hospital Problems    *Anxiety F41.9      Major depressive disorder, recurrent episode, moderate (H) F33.1      Primary insomnia F51/01      LASHON (generalized anxiety disorder) F41.1        Clinical Summary and Substantiation of Recommendations   A lower level of care has been unsuccessful in treating and stabilizing patient's mental health symptoms. Patient's uncontrollable anxiety would be best supported by the safety and stabilization offered in the [facility] unit.   Patient will remain on [facility] unit under observation for continued monitoring, treatment and therapeutic intervention of mental health symptoms. Observation at [facility] could help mitigate the need for a more restrictive level of care in an inpatient setting.    Patient coping skills attempted to reduce the crisis:  Prior to arrival Yamileth attempted to calm her anxiety with calls to friends and family, attempts a restorative rest. Attempted to engage with Yamileth F Duke offering therapeutic listening and support for the current crisis.    Disposition  Recommended disposition: Observation        Reviewed case and recommendations with attending provider. Attending Name: Emergency Department provider, MD JERRI Vazquez, was informed about the recommended disposition of observation. They are in support of the disposition.       Attending concurs with disposition: yes       Patient and/or validated legal guardian concurs with disposition:   yes       Final disposition:  observation    Legal status on admission: Voluntary/Patient has signed consent for treatment    Assessment Details   Total duration spent with the patient: 30 min     CPT code(s) utilized: 35644 - Psychotherapy for Crisis - 60 (30-74*) min    IRMA Alegria, Psychotherapist  DEC - Triage & Transition Services  Callback: [phone]

## 2024-09-10 NOTE — PROGRESS NOTES
Patient watching TV. RN checked in with patient. Patient denies need for anything at this time. Cooperative during interactions.

## 2024-09-10 NOTE — ED NOTES
[facility]  ED to EMPATH Checklist:      Goal for EMPATH: Anxiety management    Current Behavior: Anxious and Cooperative    Safety Concerns: None    Legal Hold Status: Voluntary    Medically Cleared by ED provider: Yes    Patient Therapeutically Searched: Not searched - Currently in triage    Belongings: Remain with patient    Independent Ambulation at Baseline: Yes/No: Yes    Participates in Care/Conversation: Yes/No: Yes    Patient Informed about EMPATH: Yes/No: Yes    DEC: Ordered and pending    Patient Ready to be Transferred to EMPATH? Yes/No: Yes

## 2024-09-10 NOTE — ED TRIAGE NOTES
Pt presents with increasing anxiety. Pt reports she has been having anxiety constantly since Saturday. Pt recently admitted to [facility] for MH. Pt also reports [facility] in beginning of August.      Triage Assessment (Adult)       Row Name 09/10/24 1217          Triage Assessment    Airway WDL WDL        Cognitive/Neuro/Behavioral WDL    Cognitive/Neuro/Behavioral WDL mood/behavior     Mood/Behavior anxious

## 2024-09-10 NOTE — ED PROVIDER NOTES
"  Emergency Department Note      History of Present Illness     Chief Complaint  Panic Attack    COLIN Duke is a 79 year old female who presents to the emergency room with significant anxiety.  She states that she has had worsening anxiety for the last 2 months, takes Ativan at night to help, but does not feel like she can take it during the day and this is when her symptoms are the worst.  She states that she went to an outside hospital, but \"they did not help me\" and the patient now presents here asking for mental health help.  She has no medical concerns, states that she very much would like to talk to a mental health provider, but denies any desire to hurt herself or hurt other people.      Independent Historian  No    Review of External Notes  Yes I reviewed the patient's last admission to [facility] for psychiatric causes and major depressive disorder.  It looks like this was a voluntary admission.      Past Medical History   Medical History and Problem List  Past Medical History:   Diagnosis Date    Depression        Medications  LORazepam (ATIVAN) 0.5 MG tablet  mirtazapine (REMERON) 15 MG tablet  phenazopyridine (PYRIDIUM) 100 MG tablet  timolol maleate (TIMOPTIC) 0.5 % ophthalmic solution        Surgical History   Past Surgical History:   Procedure Laterality Date    COLONOSCOPY      COLONOSCOPY N/A 5/1/2015    Procedure: COMBINED COLONOSCOPY, SINGLE OR MULTIPLE BIOPSY/POLYPECTOMY BY BIOPSY;  Surgeon: Joe Mosley MD;  Location:  GI    COLPORRHAPHY ANTERIOR, POSTERIOR, COMBINED  1/31/2014    Procedure: COMBINED COLPORRHAPHY ANTERIOR, POSTERIOR;  ANTERIOR AND POSTERIOR REPAIR, ENTEROCOELE, PERINEOPLASTY;  Surgeon: Stefano Hunt MD;  Location:  OR    CYSTOCELE REPAIR  2014    EYE SURGERY      retinal detachment repair    EYE SURGERY      cataract removal    GYN SURGERY  1998    D&C, right ovary removed    GYN SURGERY  2000    hysterectomy, bladder repair    RECTOCELE REPAIR  " 2014         Physical Exam   Patient Vitals for the past 24 hrs:   BP Temp Temp src Pulse Resp SpO2   09/10/24 1216 (!) 137/93 96.8  F (36  C) Temporal 69 18 95 %       Physical Exam  Vitals: reviewed by me  General: Pt seen on hospital [place], pleasant, cooperative, and alert to conversation  Eyes: Tracking well, clear conjunctiva BL  ENT: MMM, midline trachea.   Lungs: No tachypnea, no accessory muscle use. No respiratory distress.   CV: Rate as above  MSK: no joint effusion.  No evidence of trauma  Skin: No rash  Neuro: Clear speech and no facial droop.  Psych: Not RIS, no e/o AH/VH.  Denies any suicidal or homicidal ideation, does appear to be very anxious however.          ED Course      Medications Administered   Medications   LORazepam (ATIVAN) tablet 1 mg (has no administration in time range)          Procedures      Discussion of Management   Yes I have requested a formal mental health assessment with the DEC .        Optional/Additional Documentation  Stress/Adjustment Disorders      Medical Decision Making / Diagnosis       MDM  This is a very pleasant 79-year-old female who presents to the emergency room with what appears to be significant anxiety.  She states that she has no medical complaint at this time, has not taken any steps to harm herself.  She states that she would like mental health care at this time, and is amenable to going to [facility].  I did give her a as needed dose of Ativan as it sound like this is worked in the past, and I do that she is medically cleared at this time.  Vital signs are within normal limits, she has a normal gross exam, and certainly does seem like she would benefit from a mental health exam.    ICD-10 Codes:    ICD-10-CM    1. Anxiety  F41.9              Discharge Medications  New Prescriptions    No medications on file                  Edgar Garcia MD  09/10/24 4277

## 2024-09-10 NOTE — ED NOTES
Patient brought over from ED triage.  States she was directed here by Kaiden after they made a follow up call from her Aug 28th to Sept 6 hospitalization on the behavioral health unit at [facility].  She was hospitalized for anxiety and the anxiety is not better.  She called her psych doctor and they were not in and the others would not order anything.  She states that the hydroxyzine does not help.  She takes ativan 1mg at hs and she is able to sleep.  She had h pylori  starting in July and she states she has been very anxious then and has lost 20lbs since. Nursing and risk assessments completed.  Assessments reviewed with LMHP and physician. Video monitoring in progress, patient informed.  Admission information reviewed with patient. Patient given a tour of [facility] and instructions on using the facility. Questions regarding [facility] addressed. Pt search completed and belongings inventoried.

## 2024-09-11 VITALS
TEMPERATURE: 98.1 F | DIASTOLIC BLOOD PRESSURE: 81 MMHG | RESPIRATION RATE: 16 BRPM | WEIGHT: 129 LBS | SYSTOLIC BLOOD PRESSURE: 120 MMHG | HEIGHT: 67 IN | OXYGEN SATURATION: 94 % | HEART RATE: 64 BPM | BODY MASS INDEX: 20.25 KG/M2

## 2024-09-11 PROCEDURE — 99232 SBSQ HOSP IP/OBS MODERATE 35: CPT | Performed by: PSYCHIATRY & NEUROLOGY

## 2024-09-11 PROCEDURE — G0378 HOSPITAL OBSERVATION PER HR: HCPCS

## 2024-09-11 PROCEDURE — 250N000009 HC RX 250: Performed by: PSYCHIATRY & NEUROLOGY

## 2024-09-11 PROCEDURE — 250N000013 HC RX MED GY IP 250 OP 250 PS 637: Performed by: PSYCHIATRY & NEUROLOGY

## 2024-09-11 RX ORDER — GABAPENTIN 300 MG/1
300 CAPSULE ORAL 2 TIMES DAILY PRN
Status: DISCONTINUED | OUTPATIENT
Start: 2024-09-11 | End: 2024-09-12 | Stop reason: HOSPADM

## 2024-09-11 RX ORDER — GABAPENTIN 300 MG/1
300 CAPSULE ORAL ONCE
Status: COMPLETED | OUTPATIENT
Start: 2024-09-11 | End: 2024-09-11

## 2024-09-11 RX ADMIN — MIRTAZAPINE 7.5 MG: 7.5 TABLET, FILM COATED ORAL at 22:22

## 2024-09-11 RX ADMIN — CITALOPRAM HYDROBROMIDE 20 MG: 20 TABLET ORAL at 08:30

## 2024-09-11 RX ADMIN — APIXABAN 2.5 MG: 2.5 TABLET, FILM COATED ORAL at 08:30

## 2024-09-11 RX ADMIN — GABAPENTIN 300 MG: 300 CAPSULE ORAL at 13:40

## 2024-09-11 RX ADMIN — TIMOLOL MALEATE 1 DROP: 5 SOLUTION/ DROPS OPHTHALMIC at 08:30

## 2024-09-11 RX ADMIN — LORAZEPAM 1 MG: 1 TABLET ORAL at 22:22

## 2024-09-11 RX ADMIN — APIXABAN 2.5 MG: 2.5 TABLET, FILM COATED ORAL at 20:50

## 2024-09-11 RX ADMIN — METOPROLOL SUCCINATE 12.5 MG: 25 TABLET, EXTENDED RELEASE ORAL at 08:30

## 2024-09-11 NOTE — PROGRESS NOTES
Patient sitting in recliner chair watching TV. RN checked in on patient. Patient stated she is feeling less anxious after taking gabapentin earlier. States it took a while to work, but she did notice feeling much calmer and less restless. Reports she has finally been able to relax in the recliner chair for the first time today. States her appetite improved after taking the gabapentin too, and was able to eat most of her dinner. Agreeable to spend another night. Denies need for anything at this time.

## 2024-09-11 NOTE — PROGRESS NOTES
Pt appeared to rest comfortably through the night in sensory room C.  She woke occasionally to use the restroom, and move from the recliner to the mat.  No signs of emotional distress, physical discomfort or pain were noted during rounds or reported by the patient through the shift.  Even and unlabored respirations visualized during rounds, pt made occasional independent position changes through the night.

## 2024-09-11 NOTE — ED NOTES
Patient denies suicidal ideation.  She has been feeling distressed and hopeless all day. She states that the only thing that helps her is talking.  She does not want to go back to her home because she is all alone. She lives in an assisted living.  She states that she cannot concentrate.  She has been trying to watch tv.  She was just given gabapentin.  Will evaluate how it works.  She will be staying on observation tonight.

## 2024-09-11 NOTE — PROGRESS NOTES
Patient finished eating dinner. Walked around unit briefly. Denies need for anything at this time.

## 2024-09-11 NOTE — ED PROVIDER NOTES
EmPATH Unit - Psychiatric Consultation  [facility] Emergency Department    Yamileth Duke MRN: [id number]   Age: 79 year old YOB: 1945     History     Chief Complaint   Patient presents with    Panic Attack     HPI  Yamileth Duke is a 79 year old female with history notable for major depressive disorder and generalized anxiety disorder who is currently under observation status on the EmPATH unit, now approaching 26 hours in the emergency department.  Overnight, there were no acute issues.  On reassessment today, the patient recalls sleeping much better with the addition of mirtazapine.  This morning, she was disappointed to experience another escalation of her anxiety.  She has continued to feel anxious throughout the day, citing the intensity to be high and similar to what prompted her arrival in the emergency department.  She recalls that an additional dose of Ativan was quite helpful at alleviating her anxiety yesterday, leading to improved appetite and relaxation the remainder of the day.  She reiterates maintaining symptom remission for a meaningful period of time until she experienced an H. pylori infection, leading to a difficult medical course, and reemergence of anxiety and panic symptoms.  She has struggled to regain symptom stability since then.  Today, she denied suicidal and homicidal thoughts although maintains a sense of helplessness and hopelessness.  There was no indication of psychosis.    Past Medical History  Past Medical History:   Diagnosis Date    Depression      Past Surgical History:   Procedure Laterality Date    COLONOSCOPY      COLONOSCOPY N/A 5/1/2015    Procedure: COMBINED COLONOSCOPY, SINGLE OR MULTIPLE BIOPSY/POLYPECTOMY BY BIOPSY;  Surgeon: Joe Mosley MD;  Location:  GI    COLPORRHAPHY ANTERIOR, POSTERIOR, COMBINED  1/31/2014    Procedure: COMBINED COLPORRHAPHY ANTERIOR, POSTERIOR;  ANTERIOR AND POSTERIOR REPAIR, ENTEROCOELE, PERINEOPLASTY;  Surgeon: Marilee  "Stefano Elise MD;  Location: [facility] OR    CYSTOCELE REPAIR  2014    EYE SURGERY      retinal detachment repair    EYE SURGERY      cataract removal    GYN SURGERY  1998    D&C, right ovary removed    GYN SURGERY  2000    hysterectomy, bladder repair    RECTOCELE REPAIR  2014     apixaban ANTICOAGULANT (ELIQUIS) 2.5 MG tablet  citalopram (CELEXA) 20 MG tablet  hydrOXYzine griselda (VISTARIL) 25 MG capsule  LORazepam (ATIVAN) 0.5 MG tablet  metoprolol succinate ER (TOPROL XL) 25 MG 24 hr tablet  pantoprazole (PROTONIX) 20 MG EC tablet  timolol maleate (TIMOPTIC) 0.5 % ophthalmic solution      Allergies   Allergen Reactions    No Clinical Screening - See Comments      PN: LW Other1: -nka  PN: LW CM1: CONTRAST- nka Reaction :    Penicillins Rash and Other (See Comments)     Family History  No family history on file.  Social History   Social History     Tobacco Use    Smoking status: Never    Smokeless tobacco: Never   Substance Use Topics    Alcohol use: Yes     Comment: occasionally    Drug use: No          Review of Systems  A medically appropriate review of systems was performed with pertinent positives and negatives noted in the HPI, and all other systems negative.    Physical Examination   BP: (!) 137/93  Pulse: 69  Temp: 96.8  F (36  C)  Resp: 18  Height: 170.2 cm (5' 7\")  Weight: 58.5 kg (129 lb)  SpO2: 95 %    Physical Exam  General: Appears stated age.   Neuro: Alert and fully oriented. Extremities appear to demonstrate normal strength on visual inspection.   Integumentary/Skin: no rash visualized, normal color    Psychiatric Examination   Appearance: awake, alert  Attitude:  cooperative  Eye Contact:  fair  Mood:  anxious  Affect:  mood congruent  Speech:  clear, coherent  Psychomotor Behavior:  no evidence of tardive dyskinesia, dystonia, or tics  Thought Process:  logical and linear  Associations:  no loose associations  Thought Content:  no evidence of suicidal ideation or homicidal ideation and no evidence of " psychotic thought  Insight:  fair  Judgement:  fair  Oriented to:  time, person, and place  Attention Span and Concentration:  fair  Recent and Remote Memory:  fair  Language: able to name/identify objects without impairment  Fund of Knowledge: intact with awareness of current and past events    ED Course        Labs Ordered and Resulted from Time of ED Arrival to Time of ED Departure - No data to display    Assessments & Plan (with Medical Decision Making)   Patient presenting with worsening anxiety and panic despite engaging in routine outpatient care.  Her treatment plan focused on medication optimization while aiming to transition her care back to IOP. Nursing notes reviewed noting no acute issues.       I have reviewed the assessment completed by the [facility].     Preliminary diagnosis:    ICD-10-CM    1. Major depressive disorder, recurrent episode, moderate (H)  F33.1       2. LASHON (generalized anxiety disorder)  F41.1       3. Primary insomnia  F51.01            Treatment Plan:  -Continue mirtazapine 7.5 mg nightly for augmentation of her antidepressant medication with secondary benefits at addressing insomnia and helping to normalize her appetite.  Sleep has improved and she is now awaiting therapeutic gain at addressing symptoms of her anxiety.  -In an attempt to minimize lorazepam use, I will add gabapentin 300 mg twice a day to aid in reduction of anxiety until mirtazapine offers therapeutic benefit.  Risks and benefits were reviewed.  -Continue lorazepam 1 mg nightly for insomnia.  Longer term medication plan could involve gradual dose reduction once her response to mirtazapine has been established.  -Anticipate resuming outpatient medication management appointments, psychotherapy, and IOP  -Continue observation status and reassess tomorrow    --  Ferny Vazquez MD   [facility] EMERGENCY DEPT  EmPATH Unit       Ferny Vazquez MD  09/11/24 1699

## 2024-09-11 NOTE — PROGRESS NOTES
"Triage & Transition Services, Extended Care     Therapy Progress Note    Patient: Yamileth goes by \"Yamileth,\" uses she/her pronouns  Date of Service: September 11, 2024  Site of Service: [facility] EMERGENCY DEPT                             [id number]    Patient was seen yes  Mode of Assessment: In person    Presentation Summary: Patient was resting in her recliner in Sensory Room C.  She freely engages in conversation, talking about her anxiety struggles recently including a 9-day inpatient mental health admission, unable to complete intake at [facility] yesterday.  She has a supportive professional network including [facility] , Therapist, psychiatrist, and [facility] if she is able to finish intake process.  Patient recognizes being on some effective antidepressant medications until they stopped being effective for her.  Patient notes sleeping well last night with the addition of Mirtazapine however woke up with immediate anxiety.  She is not sure if she is comfortable discharging home yet today and shares her positive experience so far here.  Patient was provided handouts later about the Anxiety Spiral and a list of grounding techniques to practice throughout the day.   Patient appreciates the validation provided about being overwhelmed by anxiety, the difficulty it causes her day to day functioning, and not wanting to live in this anxious state forever.  She notes feeling better after having people to talk with about her concerns.    Therapeutic Intervention(s) Provided: Engaged in guided discovery, explored patient's perspectives and helped expand them through socratic dialogue., Coached on coping techniques/relaxation skills to help improve distress tolerance and managing intense emotions., Taught the link between thoughts, feelings, and behaviors., Reviewed healthy living that supports positive mental health, including looking at sleep hygiene, regular movement, nutrition, and regular " socialization., Identified and practiced coping skills.    Current Symptoms: racing thoughts, excessive worry, anxious helplessness, hopelessness, withdrawal/isolation racing thoughts, excessive worry, anxious  (none observed or reported) loss of appetite (anxiety makes patient less hungry)    Mental Status Exam   Affect: Appropriate  Appearance: Appropriate  Attention Span/Concentration: Attentive  Eye Contact: Engaged    Fund of Knowledge: Appropriate   Language /Speech Content: Fluent  Language /Speech Volume: Normal  Language /Speech Rate/Productions: Normal  Recent Memory: Intact  Remote Memory: Intact  Mood: Anxious  Orientation to Person: Yes   Orientation to Place: Yes  Orientation to Time of Day: Yes  Orientation to Date: Yes     Situation (Do they understand why they are here?): Yes  Psychomotor Behavior: Normal  Thought Content: Clear  Thought Form: Goal Directed, Intact    Treatment Objective(s) Addressed: rapport building, orienting the patient to therapy, processing feelings, assessing safety, identifying additional supports, building skills    Patient Response to Interventions: eager to participate, acceptance expressed, verbalizes understanding    Progress Towards Goals: Patient Reports Symptoms Are: ongoing  Patient Progress Toward Goals: other  Comment: sleep was improved however anxiety continues to linger and fester  Next Step to Work Toward Discharge: symptom stabilization  Symptom Stabilization Comment: manage anxiety    Case Management: Summary of Interaction: none    Plan: observation  yes provider, RN Andish  yes    Clinical Substantiation: Patient will remain in observation status tonight as she continues to experience heightened anxiety that causes frustration, inability to function at times, and loss of appetite.  Patient is open to medication discussion with provider to find the correct balance.  She was provided educational handout about anxiety spiral and grounding techniques.  Patient  is established with outpatient therapy and medication management providers.    Legal Status: Legal Status at Admission: Voluntary/Patient has signed consent for treatment    Session Status: Time session started: 0916  Time session ended: 0936  Session Duration (minutes): 20 minutes  Session Number: 1  Anticipated number of sessions or this episode of care: 3    Time Spent: 20 minutes    CPT Code: CPT Codes: 46074 - Psychotherapy (with patient) - 30 (16-37*) min    Diagnosis:   Patient Active Problem List   Diagnosis Code    Vaginal prolapse N81.10    Anxiety F41.9    Major depressive disorder, recurrent episode, moderate (H) F33.1    Primary insomnia F51.01    LASHON (generalized anxiety disorder) F41.1       Primary Problem This Admission: Active Hospital Problems    Major depressive disorder, recurrent episode, moderate (H)      Primary insomnia      LASHON (generalized anxiety disorder)      AMADOU Rodriguez, [facility]  Licensed Mental Health Professional (LMHP)  [facility], [phone]

## 2024-09-12 PROCEDURE — 250N000013 HC RX MED GY IP 250 OP 250 PS 637: Performed by: PSYCHIATRY & NEUROLOGY

## 2024-09-12 PROCEDURE — 99239 HOSP IP/OBS DSCHRG MGMT >30: CPT | Performed by: PSYCHIATRY & NEUROLOGY

## 2024-09-12 PROCEDURE — G0378 HOSPITAL OBSERVATION PER HR: HCPCS

## 2024-09-12 RX ORDER — PANTOPRAZOLE SODIUM 20 MG/1
20 TABLET, DELAYED RELEASE ORAL
Status: DISCONTINUED | OUTPATIENT
Start: 2024-09-12 | End: 2024-09-12 | Stop reason: HOSPADM

## 2024-09-12 RX ORDER — PANTOPRAZOLE SODIUM 20 MG/1
20 TABLET, DELAYED RELEASE ORAL
Status: DISCONTINUED | OUTPATIENT
Start: 2024-09-12 | End: 2024-09-12

## 2024-09-12 RX ORDER — GABAPENTIN 300 MG/1
CAPSULE ORAL
Qty: 60 CAPSULE | Refills: 0 | Status: SHIPPED | OUTPATIENT
Start: 2024-09-12 | End: 2024-10-12

## 2024-09-12 RX ORDER — MIRTAZAPINE 7.5 MG/1
7.5 TABLET, FILM COATED ORAL AT BEDTIME
Qty: 30 TABLET | Refills: 0 | Status: SHIPPED | OUTPATIENT
Start: 2024-09-12

## 2024-09-12 RX ADMIN — TIMOLOL MALEATE 1 DROP: 5 SOLUTION/ DROPS OPHTHALMIC at 08:43

## 2024-09-12 RX ADMIN — CITALOPRAM HYDROBROMIDE 20 MG: 20 TABLET ORAL at 08:42

## 2024-09-12 RX ADMIN — MELATONIN TAB 3 MG 3 MG: 3 TAB at 01:22

## 2024-09-12 RX ADMIN — METOPROLOL SUCCINATE 12.5 MG: 25 TABLET, EXTENDED RELEASE ORAL at 08:42

## 2024-09-12 RX ADMIN — PANTOPRAZOLE SODIUM 20 MG: 20 TABLET, DELAYED RELEASE ORAL at 16:33

## 2024-09-12 RX ADMIN — GABAPENTIN 300 MG: 300 CAPSULE ORAL at 10:12

## 2024-09-12 RX ADMIN — GABAPENTIN 300 MG: 300 CAPSULE ORAL at 16:46

## 2024-09-12 RX ADMIN — APIXABAN 2.5 MG: 2.5 TABLET, FILM COATED ORAL at 08:44

## 2024-09-12 ASSESSMENT — COLUMBIA-SUICIDE SEVERITY RATING SCALE - C-SSRS
2. HAVE YOU ACTUALLY HAD ANY THOUGHTS OF KILLING YOURSELF?: NO
TOTAL  NUMBER OF INTERRUPTED ATTEMPTS SINCE LAST CONTACT: NO
SUICIDE, SINCE LAST CONTACT: NO
ATTEMPT SINCE LAST CONTACT: NO
1. SINCE LAST CONTACT, HAVE YOU WISHED YOU WERE DEAD OR WISHED YOU COULD GO TO SLEEP AND NOT WAKE UP?: NO
6. HAVE YOU EVER DONE ANYTHING, STARTED TO DO ANYTHING, OR PREPARED TO DO ANYTHING TO END YOUR LIFE?: NO
TOTAL  NUMBER OF ABORTED OR SELF INTERRUPTED ATTEMPTS SINCE LAST CONTACT: NO

## 2024-09-12 ASSESSMENT — ACTIVITIES OF DAILY LIVING (ADL)
ADLS_ACUITY_SCORE: 35

## 2024-09-12 NOTE — PROGRESS NOTES
"Patient noticed laying in sensory room C on the mat with frequent legs movement. Writer approached patient after a bathroom trip and asked how she was feeling. Patient responded \"I am fine but can hardly sleep\". Melatonin offered and patient agreed. 3mg Melatonin given.   "

## 2024-09-12 NOTE — ED NOTES
Patient woke up and states that her anxiety is better now # 4. Denies SI and is hoping to go home today. At 10:00 pt said anxiety is increasing and asked for Gabapentin which was given at 1012. This writer checked in at 11:00 and at 12noon and anxiety has not gotten better yet # 9. Pt said that yesterday it took over 2 hours before she felt better after taking Gabapentin. Will continue to monitor.

## 2024-09-12 NOTE — DISCHARGE INSTRUCTIONS
Aftercare Plan  If I am feeling unsafe or I am in a crisis, I will:   Contact my established care providers   Call the National Suicide Prevention Lifeline: 988  Go to the nearest emergency room   Call 911     [facility] follow up appointment  October 16 at 8am with [place] at [place] location    Continue with established therapist at [facility]    Continue with established psychiatrist    Utilize your coping skills and referencing the Anxiety Spiral to keep anxiety levels at the lowest level possible      Additional Information  Today you were seen by a licensed mental health professional through Triage and Transition services, [facility] ([facility])  for a crisis assessment in the Emergency Department at [facility].  It is recommended that you follow up with your established providers (psychiatrist, mental health therapist, and/or primary care doctor - as relevant) as soon as possible. Coordinators from [facility] will be calling you in the next 24-48 hours to ensure that you have the resources you need.  You can also contact [facility] coordinators directly at [phone]. You may have been scheduled for or offered an appointment with a mental health provider. [facility] maintains an extensive network of licensed behavioral health providers to connect patients with the services they need.  We do not charge providers a fee to participate in our referral network.  We match patients with providers based on a patient's specific needs, insurance coverage, and location.  Our first effort will be to refer you to a provider within your care system, and will utilize providers outside your care system as needed.

## 2024-09-12 NOTE — PROGRESS NOTES
"Triage and Transition Services Extended Care Reassessment     Patient: Yamileth goes by \"Yamileth,\" uses she/her pronouns  Date of Service: September 12, 2024  Site of Service: [facility] EMERGENCY DEPT                             [id number]    Patient was seen yes  Mode of Assessment: In person     Reason for Reassessment:  (discharge)    History of Patient's Original Emergency Room Encounter: anxiety    Current Patient Presentation: less anxious however still has spikes of intense anxiety    Presentation Summary: Patient has been up and about most of the morning and sharing that she continues to struggle with thoughts of worrying about the anxiety getting worse.  She reports another good night of sleep yet waking up with a sense of dread and anxiousness from the get go.  Patient indicates having difficulty with focus and concentration when the anxiety is heightened.  Writer referenced the Anxiety Spiral graphic provided yesterday to use as a tool to prevent anxiety from getting too intense by incorporating distraction and coping skills in the lower anxious moments.  Patient's mindset is also rooted in the past and how things did not work so writer practiced some cognitive reframing to put emphasis on the current positive trends and future engagements.  Patient does verbalize understanding that medications take time to work and needs to remind self of that.  Patient reports having therapy scheduled for tomorrow afternoon and understands the importance of attending that appointment.  Patient reports finding benefit from writing in a journal again and will continue that at home.  During discharge planning patient reports feeling overwhelmed and losing her sense of relief.   Writer offered to call [facility] to see what needs to be done to continue intake for their Day Treatment program.  She is going to call her sons to coordinate transportation home.    Changes Observed Since Initial Assessment: decrease in " presenting symptoms, provider request    Therapeutic Interventions Provided: Engaged in safety planning, Engaged in cognitive restructuring/ reframing, looked at common cognitive distortions and challenged negative thoughts., Engaged in guided discovery, explored patient's perspectives and helped expand them through socratic dialogue., Engaged in activity scheduling and behavioral activation, looking at and reviewing the prior week's goals, problem solving any barriers and acknowledging successes, as well as setting new goals., Coached on coping techniques/relaxation skills to help improve distress tolerance and managing intense emotions., Taught the link between thoughts, feelings, and behaviors., Reviewed healthy living that supports positive mental health, including looking at sleep hygiene, regular movement, nutrition, and regular socialization., Provided positive reinforcement for progress towards goals, gains in knowledge, and application of skills previously taught., Engaged in social skills training., Identified and practiced coping skills., Discussed and practiced mindfulness.    Current Symptoms: racing thoughts, anxious helplessness, hopelessness, withdrawal/isolation racing thoughts, anxious  (none noted)  (appetite is poor when highly anxious)    Mental Status Exam   Affect: Appropriate  Appearance: Appropriate  Attention Span/Concentration: Attentive  Eye Contact: Engaged    Fund of Knowledge: Appropriate   Language /Speech Content: Fluent  Language /Speech Volume: Normal  Language /Speech Rate/Productions: Normal  Recent Memory: Intact  Remote Memory: Intact  Mood: Anxious  Orientation to Person: Yes   Orientation to Place: Yes  Orientation to Time of Day: Yes  Orientation to Date: Yes     Situation (Do they understand why they are here?): Yes  Psychomotor Behavior: Normal  Thought Content: Clear  Thought Form: Goal Directed, Intact    Treatment Objective(s) Addressed: rapport building, orienting the  patient to therapy, processing feelings, identifying an appropriate aftercare plan, assessing safety, identifying additional supports, exploring obstacles to safety in the community    Patient Response to Interventions: eager to participate, acceptance expressed, verbalizes understanding    Progress Towards Goals:  Patient Reports Symptoms Are: improving  Patient Progress Toward Goals: is making progress  Comment: sleep continues to be good however anxiety remains during day with varied intensity  Next Step to Work Toward Discharge: symptom stabilization, follow up on referrals  Symptom Stabilization Comment: manage anxiety  Symptom Stabilization Comment: attend therapy tomorrow, [facility] intake in October    Case Management: Summary of Interaction: none    C-SSRS Since Last Contact:   1. Wish to be Dead (Since Last Contact): No  2. Non-Specific Active Suicidal Thoughts (Since Last Contact): No     Actual Attempt (Since Last Contact): No  Has subject engaged in non-suicidal self-injurious behavior? (Since Last Contact): No  Interrupted Attempts (Since Last Contact): No  Aborted or Self-Interrupted Attempt (Since Last Contact): No  Preparatory Acts or Behavior (Since Last Contact): No  Suicide (Since Last Contact): No     Calculated C-SSRS Risk Score (Since Last Contact): No Risk Indicated    Plan: Final Disposition / Recommended Care Path: discharge  Plan for Care reviewed with assigned Medical Provider: yes  Plan for Care Team Review: provider, RN  Comments: Andish  Patient and/or validated legal guardian concurs: yes  Clinical Substantiation: Patient is being recommended for discharge today.  She reports sleeping better and her day time anxiety remains yet is able to experience longer durations of lower intensity. Patient has been receptive to coping skill interventions coupled with medications to find optimum support for anxiety.  Patient has identified the social connections here at [facility] as an important  component to her recovery.  Patient has been engaged in journaling and deep breathing as coping skills.  Patient will be transported home by family.    Legal Status: Legal Status at Admission: Voluntary/Patient has signed consent for treatment    Session Status: Time session started: 1045  Time session ended: 1120  Session Duration (minutes): 35 minutes  Session Number: 2  Anticipated number of sessions or this episode of care: 2    Session Start Time: 1045  Session Stop Time: 1120  CPT codes: 41855 - Psychotherapy (with patient) - 30 (16-37*) min  Time Spent: 35 minutes      CPT code(s) utilized: 93036 - Psychotherapy (with patient) - 30 (16-37*) min    Diagnosis:   Patient Active Problem List   Diagnosis Code    Vaginal prolapse N81.10    Anxiety F41.9    Major depressive disorder, recurrent episode, moderate (H) F33.1    Primary insomnia F51.01    LASHON (generalized anxiety disorder) F41.1       Primary Problem This Admission: Active Hospital Problems    Major depressive disorder, recurrent episode, moderate (H)      Primary insomnia      LASHON (generalized anxiety disorder)      Bry Dolan, AMADOU, [facility]  Licensed Mental Health Professional (LMHP)  Em[facility], [phone]

## 2024-09-12 NOTE — ED PROVIDER NOTES
"EmPATH Unit - Psychiatric Observation Discharge Summary  [facility] Emergency Department  Discharge Date: 9/12/2024    Yamileth Duke MRN: [id number]   Age: 79 year old YOB: 1945     Brief HPI & Initial ED Course     Chief Complaint   Patient presents with    Panic Attack     HPI  Yamileth Duke is a 79 year old female with history notable for major depressive disorder and anxiety who is currently under observation status on the EmPATH unit, now approaching 50 hours in the emergency department.  Overnight, there were no acute issues.  On reassessment today, the patient recalls responding very well to gabapentin yesterday, noting near resolution of her anxiety a couple hours after taking it.  She slept well throughout the night, noting 1 or 2 awakenings, however was able to fall back asleep easily.  This morning, her anxiety began to reemerge and she admits that this was disappointing to experience.  She has also been experiencing some GERD-like symptoms while highlighting that she has not been receiving her Protonix.  She admits to feeling anxious regarding returning home which may be contributing to her heightened state of anxiety today.  After taking another dose of gabapentin today, she has gained partial response although states that it did not fully alleviate her anxiety as it did yesterday.  She denied side effects to her medications.  Appetite has been fair.  Energy is low.  Concentration is fair.  She denied suicidal and homicidal thoughts.  There was no indication of psychosis.  She feels supported by her peers in her living environment and is comfortable returning home today.        Physical Examination   BP: 120/81  Pulse: 64  Temp: 98.1  F (36.7  C)  Resp: 16  Height: 170.2 cm (5' 7\")  Weight: 58.5 kg (129 lb)  SpO2: 94 %    Physical Exam  General: Appears stated age.   Neuro: Alert and fully oriented. Extremities appear to demonstrate normal strength on visual inspection. "   Integumentary/Skin: no rash visualized, normal color    Psychiatric Examination   Appearance: awake, alert  Attitude:  cooperative  Eye Contact:  fair  Mood:  anxious and better  Affect:  intensity is blunted  Speech:  clear, coherent  Psychomotor Behavior:  no evidence of tardive dyskinesia, dystonia, or tics  Thought Process:  logical and linear  Associations:  no loose associations  Thought Content:  no evidence of suicidal ideation or homicidal ideation and no evidence of psychotic thought  Insight:  fair  Judgement:  fair  Oriented to:  time, person, and place  Attention Span and Concentration:  fair  Recent and Remote Memory:  fair  Language: able to name/identify objects without impairment  Fund of Knowledge: intact with awareness of current and past events    Results        Labs Ordered and Resulted from Time of ED Arrival to Time of ED Departure - No data to display    Observation Course   The patient was found to have a psychiatric condition that would benefit from an observation stay in the emergency department for further psychiatric stabilization and/or coordination of a safe disposition. The plan upon observation admission included serial assessments of psychiatric condition, potential administration of medications if indicated, further disposition pending the patient's psychiatric course during the monitoring period.     Serial assessments of the patient's psychiatric condition were performed. Nursing notes were reviewed. During the observation period, the patient did not require medications for agitation, and did not require restraints/seclusion for patient and/or provider safety.     After a period of working with the treatment team on the EmPATH unit, the patient's mental state improved to allow a safe transition to outpatient care. After counseling on the diagnosis, work-up, and treatment plan, the patient was discharged. Close follow-up with a psychiatrist and/or therapist was recommended and  community psychiatric resources were provided. Patient is to return to the ED if any urgent or potentially life-threatening concerns.     Discharge Diagnoses:   Final diagnoses:   Major depressive disorder, recurrent episode, moderate (H)   LASHON (generalized anxiety disorder)   Primary insomnia       Treatment Plan:  -Continue gabapentin 300 mg twice a day for reduction of anxiety until mirtazapine has provided therapeutic benefit.  We discussed scheduling this dose of gabapentin over the next 5 days then attempting to change to as needed usage to better manage moments of breakthrough anxiety.  -Continue mirtazapine 7.5 mg nightly for augmentation of citalopram 20 mg daily for antidepressant treatment.  She is tolerating mirtazapine very well and has demonstrated some improvement in her sleep.  Continue to monitor her response over the upcoming weeks and consider dose optimization if indicated.  -Continue Ativan 1 mg nightly for insomnia.  As symptom intensity continues to subside and nears remission, consider gradually reducing the dose of Ativan to minimize longer term side effects.  -Protonix has been restarted today to aid in managing GERD symptoms.  -Resume outpatient medication management appointments, individual psychotherapy, and intensive outpatient programming  -Discharge home today.      At the time of discharge, the patient's acute suicide risk was determined to be low due to the following factors: Reduction in the intensity of mood/anxiety symptoms that preceded the admission, denial of suicidal thoughts, denies feeling helpless or helpless, not currently under the influence of alcohol or illicit substances, denies experiencing command hallucinations, no immediate access to firearms. The patient's acute risk could be higher if noncompliant with their treatment plan, medications, follow-up appointments or using illicit substances or alcohol. Protective factors include: social supports, stable housing    I  spent more than 30 minutes on discharge day activities.    --  Ferny Vazquez MD  [facility] EMERGENCY DEPT  EmPATH Unit       Ferny Vazquez MD  09/12/24 [id number]

## 2024-09-12 NOTE — PROGRESS NOTES
Triage & Transition Services, Extended Care     Client Name: Yamileth BAH Duke    Date: September 12, 2024    Service Type: did not attend group session due to sleeping in recliner  Session Start Time:  (P) 0950    Session End Time: (P) 1000  Session Length: (P) 10  Site Location: [facility] EMERGENCY DEPT                             [id number]  Total Number ofAttendees: (P) 0    AMADOU Rodriguez, [facility] Psychiatric CenterSW  Licensed Mental Health Professional (LMHP)  [facility], [phone]

## 2024-09-12 NOTE — ED NOTES
Patient will have her son pick her up for discharge.   Patient agrees to discharge plan. Discharge instructions reviewed with patient including follow-up care plan. Medications: sent with patient. Reviewed safety plan and outpatient resources. Denies SI and HI. All belongings that were brought into the hospital have been returned to patient. Escorted off the unit at 1700 accompanied by [facility] staff. Discharged to home via son.

## 2024-09-13 ENCOUNTER — TELEPHONE (OUTPATIENT)
Dept: BEHAVIORAL HEALTH | Facility: CLINIC | Age: 79
End: 2024-09-13
Payer: COMMERCIAL

## 2024-09-13 ENCOUNTER — PATIENT OUTREACH (OUTPATIENT)
Dept: CARE COORDINATION | Facility: CLINIC | Age: 79
End: 2024-09-13
Payer: COMMERCIAL

## 2024-09-13 NOTE — PROGRESS NOTES
Clinic Care Coordination Contact  Transitions of Care Outreach  Chief Complaint   Patient presents with    Clinic Care Coordination - Post Hospital       Most Recent Admission Date: 9/10/2024   Most Recent Admission Diagnosis:      Most Recent Discharge Date: 9/12/2024   Most Recent Discharge Diagnosis: Major depressive disorder, recurrent episode, moderate (H) - F33.1  LASHON (generalized anxiety disorder) - F41.1  Primary insomnia - F51.01     Transitions of Care Assessment    Discharge Assessment  How are you doing now that you are home?: Patient shares that she is doing very well and slept good. Patient will see her therapist at 1pm. Patient feels like she has some good things in place. Thanks writer for checking in.  How are your symptoms? (Red Flag symptoms escalate to triage hotline per guidelines): Improved  Do you know how to contact your clinic care team if you have future questions or changes to your health status? : Yes  Does the patient have their discharge instructions? : Yes  Does the patient have questions regarding their discharge instructions? : No  Were you started on any new medications or were there changes to any of your previous medications? : Yes  Does the patient have all of their medications?: Yes  Do you have questions regarding any of your medications? : No  Do you have all of your needed medical supplies or equipment (DME)?  (i.e. oxygen tank, CPAP, cane, etc.): Yes    Post-op ([facility] Only)  If the patient had a surgery or procedure, do they have any questions for a nurse?: No    Post-op (Clinicians Only)  Did the patient have surgery or a procedure: No        Follow up Plan     Discharge Follow-Up  Discharge follow up appointment scheduled in alignment with recommended follow up timeframe or Transitions of Risk Category? (Low = within 30 days; Moderate= within 14 days; High= within 7 days): Yes  Discharge Follow Up Appointment Date: 09/13/24  Discharge Follow Up Appointment Scheduled  with?: Specialty Care Provider (1pm with therapist)    No future appointments.    Outpatient Plan as outlined on AVS reviewed with patient.    For any urgent concerns, please contact our 24 hour nurse triage line: [phone] ([phone])       ZEE Vora